# Patient Record
Sex: FEMALE | Race: WHITE
[De-identification: names, ages, dates, MRNs, and addresses within clinical notes are randomized per-mention and may not be internally consistent; named-entity substitution may affect disease eponyms.]

---

## 2018-02-01 ENCOUNTER — HOSPITAL ENCOUNTER (OUTPATIENT)
Dept: HOSPITAL 45 - C.ULTR | Age: 23
Discharge: HOME | End: 2018-02-01
Attending: LEGAL MEDICINE
Payer: COMMERCIAL

## 2018-02-01 DIAGNOSIS — R10.11: Primary | ICD-10-CM

## 2018-02-01 NOTE — DIAGNOSTIC IMAGING REPORT
ABDOMEN LIMITED (US)



CLINICAL HISTORY: 22 years-old Female presenting with RT UPPER QUAD PAIN. 



TECHNIQUE: Real-time grayscale and limited color Doppler ultrasound imaging of

the abdomen limited to the right upper quadrant was performed. 



COMPARISON: None.



FINDINGS:



Pancreas: Visualized portions of the pancreatic head and body normal.



Liver: Normal echogenicity and echotexture. The liver measures 15.4 cm in

maximal sagittal dimension. No sonographic evidence of hepatic mass. 



Biliary: No intrahepatic biliary ductal dilatation. Common bile duct measures up

to 3 mm in diameter.



Gallbladder: No evidence of gallstones, gallbladder wall thickening, gallbladder

distention, or pericholecystic fluid or inflammatory change.    



Right kidney: Normal in appearance. No hydronephrosis.



Ascites: None.



IMPRESSION: 

Normal right upper quadrant ultrasound.







Electronically signed by:  Chandan Villarreal M.D.

2/1/2018 7:55 AM



Dictated Date/Time:  2/1/2018 7:55 AM